# Patient Record
Sex: FEMALE | Race: WHITE
[De-identification: names, ages, dates, MRNs, and addresses within clinical notes are randomized per-mention and may not be internally consistent; named-entity substitution may affect disease eponyms.]

---

## 2019-08-16 ENCOUNTER — HOSPITAL ENCOUNTER (OUTPATIENT)
Dept: HOSPITAL 88 - CT | Age: 64
End: 2019-08-16
Attending: FAMILY MEDICINE
Payer: COMMERCIAL

## 2019-08-16 DIAGNOSIS — M25.571: Primary | ICD-10-CM

## 2019-08-16 DIAGNOSIS — M25.461: ICD-10-CM

## 2019-08-16 DIAGNOSIS — M17.11: ICD-10-CM

## 2019-08-16 DIAGNOSIS — M62.561: ICD-10-CM

## 2019-08-16 NOTE — DIAGNOSTIC IMAGING REPORT
EXAM: CT right ankle without contrast. CT right femur without contrast 

INDICATION: Ankle pain. Femur pain. Fall. Fracture.  

^[id number]

^1415

^TRAUMA RT ANKLE/ABN X-RAY/FX CLOSED 

COMPARISON: None.

TECHNIQUE: Right ankle was scanned utilizing a multidetector helical scanner

without administration of IV contrast. Right femur was scanned utilizing a

multidetector helical scanner without administration of IV contrast. Coronal

and sagittal reformations were obtained. Routine protocol was performed. 

     IV CONTRAST: None

     ORAL CONTRAST: Water

            

COMPLICATIONS: None



RADIATION DOSE:

     Total DLP: 482.97 mGy*cm

     Estimated effective dose: (DLP x 0.015 x size factor) mSv

     CTDIvol has been reviewed. It is below the limits set by the Radiation

Protocol Committee (RPC).

     Dose modulation, iterative reconstruction, and/or weight based adjustment

of the mA/kV was utilized to reduce the radiation dose to as low as reasonably

achievable. 



FINDINGS:



Scattered vascular calcification.



Old appearing deformity of the inferior right ischium.



Surgical clips in the medial soft tissues of the thigh.



Mild diffuse muscle atrophy.



Small nonspecific inguinal lymph nodes.



Small knee joint effusion.



No acute fracture, subluxation or avascular necrosis about the femur. Scattered

degenerative changes are seen about the hip and knee.



Obliquely oriented intra-articular medial femoral condyle fracture best seen on

axial image 176. This may be old/chronic.



Old/chronic appearing obliquely oriented fracture through the distal fibula

with several adjacent well-corticated bone fragments. Scattered degenerative

change most pronounced at the tibiotalar articulation.



Small bone fragment adjacent to the tip of the medial malleolus likely due to

remote trauma.



No acute fracture, subluxation or avascular necrosis about the ankle.



Impression:



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



Old/chronic appearing obliquely oriented intra-articular medial femoral condyle

fracture in the knee with associated degenerative arthrosis.



Signed by: Dr. Landon Mckee M.D. on 8/16/2019 3:28 PM
EXAM: CT right ankle without contrast. CT right femur without contrast 

INDICATION: Ankle pain. Femur pain. Fall. Fracture.  

^[id number]

^1415

^TRAUMA RT ANKLE/ABN X-RAY/FX CLOSED 

COMPARISON: None.

TECHNIQUE: Right ankle was scanned utilizing a multidetector helical scanner

without administration of IV contrast. Right femur was scanned utilizing a

multidetector helical scanner without administration of IV contrast. Coronal

and sagittal reformations were obtained. Routine protocol was performed. 

     IV CONTRAST: None

     ORAL CONTRAST: Water

            

COMPLICATIONS: None



RADIATION DOSE:

     Total DLP: 482.97 mGy*cm

     Estimated effective dose: (DLP x 0.015 x size factor) mSv

     CTDIvol has been reviewed. It is below the limits set by the Radiation

Protocol Committee (RPC).

     Dose modulation, iterative reconstruction, and/or weight based adjustment

of the mA/kV was utilized to reduce the radiation dose to as low as reasonably

achievable. 



FINDINGS:



Scattered vascular calcification.



Old appearing deformity of the inferior right ischium.



Surgical clips in the medial soft tissues of the thigh.



Mild diffuse muscle atrophy.



Small nonspecific inguinal lymph nodes.



Small knee joint effusion.



No acute fracture, subluxation or avascular necrosis about the femur. Scattered

degenerative changes are seen about the hip and knee.



Obliquely oriented intra-articular medial femoral condyle fracture best seen on

axial image 176. This may be old/chronic.



Old/chronic appearing obliquely oriented fracture through the distal fibula

with several adjacent well-corticated bone fragments. Scattered degenerative

change most pronounced at the tibiotalar articulation.



Small bone fragment adjacent to the tip of the medial malleolus likely due to

remote trauma.



No acute fracture, subluxation or avascular necrosis about the ankle.



Impression:



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



Old/chronic appearing obliquely oriented intra-articular medial femoral condyle

fracture in the knee with associated degenerative arthrosis.



Signed by: Dr. Landon Mckee M.D. on 8/16/2019 3:28 PM
no

## 2019-08-29 ENCOUNTER — HOSPITAL ENCOUNTER (OUTPATIENT)
Dept: HOSPITAL 88 - DX | Age: 64
End: 2019-08-29
Attending: FAMILY MEDICINE
Payer: COMMERCIAL

## 2019-08-29 DIAGNOSIS — S72.91XA: ICD-10-CM

## 2019-08-29 DIAGNOSIS — S32.601A: Primary | ICD-10-CM

## 2019-08-29 DIAGNOSIS — M62.58: ICD-10-CM

## 2019-08-29 DIAGNOSIS — S82.831A: ICD-10-CM

## 2019-08-29 DIAGNOSIS — S82.51XA: ICD-10-CM

## 2019-08-29 PROCEDURE — 77080 DXA BONE DENSITY AXIAL: CPT

## 2019-08-29 NOTE — DIAGNOSTIC IMAGING REPORT
Bone density study



Clinical History:  Osteoporosis





Bone mineral density measurement

Femoral neck      0.576 gm/cm2





Standard deviation from young adult population (T-score)

Femoral neck      -2.5





Standard deviation for age adjusted population (Z-score)

Femoral neck      -0.9





Comments:  The lumbar spine was not imaged secondary to surgical hardware,

there is osteopenia of the left femoral neck.  Complete computer analysis will

be sent shortly.





Diagnostic criteria for osteoporosis

BMD:  Bone mineral density 

Normal: BMD measurement less than one standard deviation from young adult

population

Osteopenia: BMD measurement between 1 and 2.5 standard deviations

Osteoporosis: BMD measurement greater than 2.5 standard deviations

Severe osteoporosis: Osteoporosis and one or more fragility fractures



Signed by: Dr. Toby Leal MD on 8/29/2019 4:28 PM

## 2020-07-24 ENCOUNTER — HOSPITAL ENCOUNTER (OUTPATIENT)
Dept: HOSPITAL 88 - CT | Age: 65
End: 2020-07-24
Attending: FAMILY MEDICINE
Payer: COMMERCIAL

## 2020-07-24 DIAGNOSIS — R10.9: Primary | ICD-10-CM

## 2020-07-24 DIAGNOSIS — N20.0: ICD-10-CM

## 2020-07-24 DIAGNOSIS — N28.1: ICD-10-CM

## 2020-07-24 PROCEDURE — 74176 CT ABD & PELVIS W/O CONTRAST: CPT

## 2020-07-24 NOTE — DIAGNOSTIC IMAGING REPORT
EXAM: CT Abdomen and Pelvis WITHOUT intravenous contrast  



INDICATION: Abdominal pain, renal calculus



COMPARISON: None.



TECHNIQUE: Abdomen and pelvis were scanned utilizing a multidetector helical

scanner from the lung base to the pubic symphysis without administration of IV

contrast. Coronal and sagittal reformations were obtained. 

     

IV CONTRAST: None

ORAL CONTRAST: Water

            

COMPLICATIONS: None



RADIATION DOSE:

Total DLP:  659 mGy*cm



Dose modulation, iterative reconstruction, and/or weight based adjustment of

the mA/kV was utilized to reduce the radiation dose to as low as reasonably

achievable. 



FINDINGS:



The superior hepatic dome was excluded from these images. If needed, the

patient can return for repeat imaging of this area at no additional cost.



LOWER THORAX: Atherosclerotic coronary artery calcifications.



HEPATOBILIARY: The superior hepatic dome is excluded from view. No focal liver

lesion. Status post cholecystectomy.



SPLEEN: No splenomegaly.



PANCREAS: No focal masses or ductal dilatation.



ADRENALS: No adrenal nodules.

KIDNEYS/URETERS: 12 mm nonobstructive left lower pole renal calculus. 4 mm

right midpole nonobstructive renal calculus. No hydronephrosis or hydroureter.

Bilateral simple appearing renal cysts measure up to 3.4 cm on the left and 2.9

cm on the right.

PELVIC ORGANS/BLADDER: Hysterectomy.



PERITONEUM / RETROPERITONEUM: No free air or fluid.

LYMPH NODES: No lymphadenopathy.

VESSELS: Diffuse atherosclerotic calcifications of the nonaneurysmal abdominal

aorta and major branches.



GI TRACT: No abnormal bowel thickening. No bowel obstruction. Increased stool

burden throughout the colon. Status post appendectomy.



BONES AND SOFT TISSUES: Postoperative findings spinal fusion at L2-4. Mild

diffuse osteopenia. Moderate degenerative changes of the visualized spine. No

acute osseous injury. No suspicious lytic or blastic lesions.



IMPRESSION: 

12 mm left lower pole and 4 mm right midpole nonobstructive renal calculi. No

hydronephrosis or hydroureter.



Bilateral simple appearing renal cysts measure up to 3.4 cm on the left and 2.9

cm on the right.





Signed by: Will Burkett MD on 7/24/2020 2:47 PM